# Patient Record
Sex: MALE | Race: BLACK OR AFRICAN AMERICAN | NOT HISPANIC OR LATINO | Employment: FULL TIME | URBAN - METROPOLITAN AREA
[De-identification: names, ages, dates, MRNs, and addresses within clinical notes are randomized per-mention and may not be internally consistent; named-entity substitution may affect disease eponyms.]

---

## 2024-07-09 ENCOUNTER — OFFICE VISIT (OUTPATIENT)
Dept: PRIMARY CARE | Facility: HOSPITAL | Age: 49
End: 2024-07-09
Payer: COMMERCIAL

## 2024-07-09 VITALS
WEIGHT: 240.9 LBS | OXYGEN SATURATION: 95 % | HEART RATE: 82 BPM | TEMPERATURE: 97.8 F | HEIGHT: 72 IN | BODY MASS INDEX: 32.63 KG/M2 | DIASTOLIC BLOOD PRESSURE: 80 MMHG | SYSTOLIC BLOOD PRESSURE: 130 MMHG

## 2024-07-09 DIAGNOSIS — Z00.00 ROUTINE HEALTH MAINTENANCE: Primary | ICD-10-CM

## 2024-07-09 DIAGNOSIS — K42.9 UMBILICAL HERNIA WITHOUT OBSTRUCTION AND WITHOUT GANGRENE: ICD-10-CM

## 2024-07-09 DIAGNOSIS — N40.1 BENIGN PROSTATIC HYPERPLASIA WITH URINARY FREQUENCY: ICD-10-CM

## 2024-07-09 DIAGNOSIS — E55.9 VITAMIN D DEFICIENCY: ICD-10-CM

## 2024-07-09 DIAGNOSIS — E88.819 INSULIN RESISTANCE: ICD-10-CM

## 2024-07-09 DIAGNOSIS — J35.1 ENLARGED TONSILS: ICD-10-CM

## 2024-07-09 DIAGNOSIS — R35.0 BENIGN PROSTATIC HYPERPLASIA WITH URINARY FREQUENCY: ICD-10-CM

## 2024-07-09 DIAGNOSIS — E78.5 HYPERLIPIDEMIA, UNSPECIFIED HYPERLIPIDEMIA TYPE: ICD-10-CM

## 2024-07-09 DIAGNOSIS — R19.4 CHANGE IN BOWEL HABITS: ICD-10-CM

## 2024-07-09 PROCEDURE — 99213 OFFICE O/P EST LOW 20 MIN: CPT | Performed by: INTERNAL MEDICINE

## 2024-07-09 PROCEDURE — 99396 PREV VISIT EST AGE 40-64: CPT | Performed by: INTERNAL MEDICINE

## 2024-07-09 RX ORDER — BISMUTH SUBSALICYLATE 262 MG
1 TABLET,CHEWABLE ORAL DAILY
COMMUNITY

## 2024-07-09 RX ORDER — ERGOCALCIFEROL 1.25 MG/1
50000 CAPSULE ORAL
Qty: 12 CAPSULE | Refills: 0 | Status: SHIPPED | OUTPATIENT
Start: 2024-07-14 | End: 2024-10-06

## 2024-07-09 ASSESSMENT — ENCOUNTER SYMPTOMS
OCCASIONAL FEELINGS OF UNSTEADINESS: 0
LOSS OF SENSATION IN FEET: 0
DEPRESSION: 0

## 2024-07-09 NOTE — PATIENT INSTRUCTIONS
Fasting blood work and urinalysis    Schedule colonoscopy    Cut out dairy products from your diet     Limit alcohol to weekends only - 2-3 per night

## 2024-07-09 NOTE — PROGRESS NOTES
Chief Complaint   Patient presents with    [facility]     NPV stomach issues         History Of Present Illness:    Abhijit Brooks is a 48 y.o. male presenting to establish care, update health maintenance, and address concerns about change in bowel habits.  Abhijit has a history of insulin resistance and dyslipidemia.  He is due for fasting blood work.  He has been drinking more alcohol recently, he is concerned that his blood sugar may be increasing.  He is a former smoker and quit 3 years ago.  He is currently drinking about half pint of alcohol per day over the past 6 months.  He admits that he drinks in response to stress.  Over the past 8 months he has been experiencing change in bowel habits.  He will wake up early in the morning to have an urgent bowel movement.  His stools are loose but there is no blood.  His symptoms primarily happen in the morning.  He does not have urgent bowel movements during the day at work.  He was recently on vacation and experienced the same symptoms.  He denies cherie abdominal pain, but he describes cramping.  He denies nausea or vomiting.  He denies fevers chills or sweats at night.  He has also been experiencing urgent urination.  Urine stream has reduced over time but is still relatively normal.  He denies pain or burning with urination.  He has observed a small bulge on his bellybutton and is concerned about hernia.      Active Medical Problems:  Patient Active Problem List    Diagnosis Date Noted    Routine health maintenance 07/12/2024    Vitamin D deficiency 07/11/2024    Benign prostatic hyperplasia with urinary frequency 07/11/2024    Change in bowel habits 07/11/2024    Umbilical hernia without obstruction and without gangrene 07/11/2024    Insulin resistance 07/09/2024    Hyperlipidemia 07/09/2024    Enlarged tonsils 07/09/2024       Past Medical History:  Past Medical History:   Diagnosis Date    Enlarged tonsils     Hyperlipidemia     Insulin resistance     Kidney  "stone 2017    passed spontaneously       Past Surgical History:  Past Surgical History:   Procedure Laterality Date    HERNIA REPAIR Bilateral          Social History:  Social History     Tobacco Use    Smoking status: Never    Smokeless tobacco: Never   Vaping Use    Vaping status: Never Used   Substance Use Topics    Alcohol use: Yes     Alcohol/week: 10.0 standard drinks of alcohol     Types: 10 Standard drinks or equivalent per week    Drug use: Never         Family History:  Family History   Problem Relation Name Age of Onset    Stroke Mother      Coronary artery disease Mother      Hypertension Mother      Hyperlipidemia Mother      Scoliosis Sister      No Known Problems Brother      No Known Problems Son          Allergies:  Patient has no known allergies.    Outpatient Medications:    Current Outpatient Medications:     [START ON 7/14/2024] ergocalciferol (Vitamin D-2) 1.25 MG (91001 UT) capsule, Take 1 capsule (50,000 Units) by mouth 1 (one) time per week., Disp: 12 capsule, Rfl: 0    multivitamin tablet, Take 1 tablet by mouth once daily., Disp: , Rfl:       Review of Systems:  Pertinent positives in review of systems outlined above.  Complete ROS otherwise negative.        Last Recorded Vitals:  Vitals:    07/09/24 1107 07/09/24 1208   BP: (!) 151/105 130/80   BP Location: Left arm    Patient Position: Sitting    BP Cuff Size: Large adult    Pulse: 82    Temp: 36.6 °C (97.8 °F)    SpO2: 95%    Weight: 109 kg (240 lb 14.4 oz)    Height: 1.816 m (5' 11.5\")    Body mass index is 33.13 kg/m².        Physical Exam  HENT:      Mouth/Throat:      Pharynx: Oropharynx is clear.   Eyes:      Extraocular Movements: Extraocular movements intact.      Conjunctiva/sclera: Conjunctivae normal.      Pupils: Pupils are equal, round, and reactive to light.   Cardiovascular:      Rate and Rhythm: Normal rate and regular rhythm.      Pulses: Normal pulses.      Heart sounds: Normal heart sounds.   Pulmonary:      Effort: " Pulmonary effort is normal.      Breath sounds: Normal breath sounds.   Abdominal:      General: Abdomen is flat. Bowel sounds are normal. There is no distension.      Palpations: Abdomen is soft. There is no mass.      Tenderness: There is no right CVA tenderness or left CVA tenderness.   Musculoskeletal:      Right lower leg: No edema.      Left lower leg: No edema.   Neurological:      General: No focal deficit present.          The 10-year ASCVD risk score (Fanny AGUILAR, et al., 2019) is: 5.2%    Values used to calculate the score:      Age: 48 years      Sex: Male      Is Non- : Yes      Diabetic: No      Tobacco smoker: No      Systolic Blood Pressure: 130 mmHg      Is BP treated: No      HDL Cholesterol: 49.8 mg/dL      Total Cholesterol: 229 mg/dL      Assessment/Plan   Problem List Items Addressed This Visit       Insulin resistance     Fasting blood sugar and hemoglobin A1c are due now.         Relevant Orders    Comprehensive Metabolic Panel (Completed)    Hemoglobin A1C (Completed)    Hyperlipidemia     Fasting lipids due now.         Relevant Orders    Lipid Panel (Completed)    Enlarged tonsils     Abhijit is currently asymptomatic.  His tonsils are enlarged, and he describes snoring at night   We discussed moving forward with a home apnea test if snoring persists or worsens.         Relevant Orders    Comprehensive Metabolic Panel (Completed)    Vitamin D deficiency     Abhijit has a history of significant vitamin D deficiency, and a vitamin D level will be included with his blood work.         Relevant Medications    ergocalciferol (Vitamin D-2) 1.25 MG (15665 UT) capsule (Start on 7/14/2024)    Other Relevant Orders    Comprehensive Metabolic Panel (Completed)    Vitamin D 25-Hydroxy,Total (for eval of Vitamin D levels) (Completed)    Benign prostatic hyperplasia with urinary frequency     PSA and urinalysis due now.  He will move forward with a trial of daily tadalafil for  symptoms persist or worsen.         Relevant Orders    Comprehensive Metabolic Panel (Completed)    Prostate Specific Antigen (Completed)    Urinalysis with Reflex Microscopic (Completed)    Change in bowel habits     Abhijit describes a subacute change in bowel habits over the past 8 months.  He is not having fevers or chills, losing weight or having blood in his stool.  That is reassuring.  His symptoms do correlate with increased alcohol intake, he is going to begin to curb his drinking.  He is going to limit drinking to 1 to 2 days/week and only 1-2 drinks per day.  Comprehensive metabolic panel, CBC, tissue transglutaminase antibodies will be included with his blood work.  He is going to observe a strict lactose-free diet.  Colon cancer screening is due now, and a colonoscopy was ordered.  He will call if his symptoms do not respond to diet changes.         Relevant Orders    Comprehensive Metabolic Panel (Completed)    Tissue Transglutaminase IgA (Completed)    Colonoscopy Screening; Average Risk Patient    Umbilical hernia without obstruction and without gangrene     Abhijit has a small reducible umbilical hernia.  We discussed that the umbilical hernia is not related to his other digestive symptoms.  He is interested in treatment and was referred to general surgery.         Relevant Orders    Referral to General Surgery    Routine health maintenance - Primary     Blood pressure top normal, addressed separately.  Fasting blood sugar and fasting lipid profile due now.  PSA screen will be updated.  Colonoscopy ordered.  Eye exam encouraged.  Tdap vaccine up-to-date.  No concerns about STIs.              Jasper Mendoza MD

## 2024-07-10 ENCOUNTER — LAB (OUTPATIENT)
Dept: LAB | Facility: LAB | Age: 49
End: 2024-07-10
Payer: COMMERCIAL

## 2024-07-10 DIAGNOSIS — N40.1 BENIGN PROSTATIC HYPERPLASIA WITH URINARY FREQUENCY: ICD-10-CM

## 2024-07-10 DIAGNOSIS — E88.819 INSULIN RESISTANCE: ICD-10-CM

## 2024-07-10 DIAGNOSIS — E55.9 VITAMIN D DEFICIENCY: ICD-10-CM

## 2024-07-10 DIAGNOSIS — R35.0 BENIGN PROSTATIC HYPERPLASIA WITH URINARY FREQUENCY: ICD-10-CM

## 2024-07-10 DIAGNOSIS — E78.5 HYPERLIPIDEMIA, UNSPECIFIED HYPERLIPIDEMIA TYPE: ICD-10-CM

## 2024-07-10 DIAGNOSIS — J35.1 ENLARGED TONSILS: ICD-10-CM

## 2024-07-10 DIAGNOSIS — R19.4 CHANGE IN BOWEL HABITS: ICD-10-CM

## 2024-07-10 LAB
25(OH)D3 SERPL-MCNC: 20 NG/ML (ref 30–100)
ALBUMIN SERPL BCP-MCNC: 4.1 G/DL (ref 3.4–5)
ALP SERPL-CCNC: 64 U/L (ref 33–120)
ALT SERPL W P-5'-P-CCNC: 39 U/L (ref 10–52)
ANION GAP SERPL CALC-SCNC: 10 MMOL/L (ref 10–20)
APPEARANCE UR: CLEAR
AST SERPL W P-5'-P-CCNC: 27 U/L (ref 9–39)
BILIRUB SERPL-MCNC: 1 MG/DL (ref 0–1.2)
BILIRUB UR STRIP.AUTO-MCNC: NEGATIVE MG/DL
BUN SERPL-MCNC: 15 MG/DL (ref 6–23)
CALCIUM SERPL-MCNC: 8.5 MG/DL (ref 8.6–10.3)
CHLORIDE SERPL-SCNC: 104 MMOL/L (ref 98–107)
CHOLEST SERPL-MCNC: 229 MG/DL (ref 0–199)
CHOLESTEROL/HDL RATIO: 4.6
CO2 SERPL-SCNC: 26 MMOL/L (ref 21–32)
COLOR UR: NORMAL
CREAT SERPL-MCNC: 0.96 MG/DL (ref 0.5–1.3)
EGFRCR SERPLBLD CKD-EPI 2021: >90 ML/MIN/1.73M*2
EST. AVERAGE GLUCOSE BLD GHB EST-MCNC: 114 MG/DL
GLUCOSE SERPL-MCNC: 94 MG/DL (ref 74–99)
GLUCOSE UR STRIP.AUTO-MCNC: NORMAL MG/DL
HBA1C MFR BLD: 5.6 %
HDLC SERPL-MCNC: 49.8 MG/DL
KETONES UR STRIP.AUTO-MCNC: NEGATIVE MG/DL
LDLC SERPL CALC-MCNC: 161 MG/DL
LEUKOCYTE ESTERASE UR QL STRIP.AUTO: NEGATIVE
NITRITE UR QL STRIP.AUTO: NEGATIVE
NON HDL CHOLESTEROL: 179 MG/DL (ref 0–149)
PH UR STRIP.AUTO: 6.5 [PH]
POTASSIUM SERPL-SCNC: 4.4 MMOL/L (ref 3.5–5.3)
PROT SERPL-MCNC: 6.7 G/DL (ref 6.4–8.2)
PROT UR STRIP.AUTO-MCNC: NEGATIVE MG/DL
PSA SERPL-MCNC: 0.28 NG/ML
RBC # UR STRIP.AUTO: NEGATIVE /UL
SODIUM SERPL-SCNC: 136 MMOL/L (ref 136–145)
SP GR UR STRIP.AUTO: 1.02
TRIGL SERPL-MCNC: 91 MG/DL (ref 0–149)
TTG IGA SER IA-ACNC: <1 U/ML
UROBILINOGEN UR STRIP.AUTO-MCNC: NORMAL MG/DL
VLDL: 18 MG/DL (ref 0–40)

## 2024-07-10 PROCEDURE — 84153 ASSAY OF PSA TOTAL: CPT

## 2024-07-10 PROCEDURE — 83516 IMMUNOASSAY NONANTIBODY: CPT

## 2024-07-10 PROCEDURE — 81003 URINALYSIS AUTO W/O SCOPE: CPT

## 2024-07-10 PROCEDURE — 80061 LIPID PANEL: CPT

## 2024-07-10 PROCEDURE — 82306 VITAMIN D 25 HYDROXY: CPT

## 2024-07-10 PROCEDURE — 83036 HEMOGLOBIN GLYCOSYLATED A1C: CPT

## 2024-07-10 PROCEDURE — 36415 COLL VENOUS BLD VENIPUNCTURE: CPT

## 2024-07-10 PROCEDURE — 80053 COMPREHEN METABOLIC PANEL: CPT

## 2024-07-11 PROBLEM — E55.9 VITAMIN D DEFICIENCY: Status: ACTIVE | Noted: 2024-07-11

## 2024-07-11 PROBLEM — K42.9 UMBILICAL HERNIA WITHOUT OBSTRUCTION AND WITHOUT GANGRENE: Status: ACTIVE | Noted: 2024-07-11

## 2024-07-11 PROBLEM — N40.1 BENIGN PROSTATIC HYPERPLASIA WITH URINARY FREQUENCY: Status: ACTIVE | Noted: 2024-07-11

## 2024-07-11 PROBLEM — R35.0 BENIGN PROSTATIC HYPERPLASIA WITH URINARY FREQUENCY: Status: ACTIVE | Noted: 2024-07-11

## 2024-07-11 PROBLEM — R19.4 CHANGE IN BOWEL HABITS: Status: ACTIVE | Noted: 2024-07-11

## 2024-07-11 NOTE — ASSESSMENT & PLAN NOTE
PSA and urinalysis due now.  He will move forward with a trial of daily tadalafil for symptoms persist or worsen.

## 2024-07-11 NOTE — ASSESSMENT & PLAN NOTE
Abhijit has a history of significant vitamin D deficiency, and a vitamin D level will be included with his blood work.

## 2024-07-11 NOTE — ASSESSMENT & PLAN NOTE
Abhijit is currently asymptomatic.  His tonsils are enlarged, and he describes snoring at night   We discussed moving forward with a home apnea test if snoring persists or worsens.

## 2024-07-11 NOTE — ASSESSMENT & PLAN NOTE
Abhijit has a small reducible umbilical hernia.  We discussed that the umbilical hernia is not related to his other digestive symptoms.  He is interested in treatment and was referred to general surgery.

## 2024-07-12 PROBLEM — Z00.00 ROUTINE HEALTH MAINTENANCE: Status: ACTIVE | Noted: 2024-07-12

## 2024-07-12 NOTE — ASSESSMENT & PLAN NOTE
Blood pressure top normal, addressed separately.  Fasting blood sugar and fasting lipid profile due now.  PSA screen will be updated.  Colonoscopy ordered.  Eye exam encouraged.  Tdap vaccine up-to-date.  No concerns about STIs.

## 2024-07-16 ENCOUNTER — OFFICE VISIT (OUTPATIENT)
Dept: SURGERY | Facility: CLINIC | Age: 49
End: 2024-07-16
Payer: COMMERCIAL

## 2024-07-16 VITALS
SYSTOLIC BLOOD PRESSURE: 127 MMHG | TEMPERATURE: 97.5 F | HEART RATE: 81 BPM | DIASTOLIC BLOOD PRESSURE: 79 MMHG | WEIGHT: 243 LBS | BODY MASS INDEX: 34.02 KG/M2 | HEIGHT: 71 IN | OXYGEN SATURATION: 96 %

## 2024-07-16 DIAGNOSIS — K42.9 UMBILICAL HERNIA WITHOUT OBSTRUCTION AND WITHOUT GANGRENE: ICD-10-CM

## 2024-07-16 PROCEDURE — 1036F TOBACCO NON-USER: CPT | Performed by: STUDENT IN AN ORGANIZED HEALTH CARE EDUCATION/TRAINING PROGRAM

## 2024-07-16 PROCEDURE — 99205 OFFICE O/P NEW HI 60 MIN: CPT | Performed by: STUDENT IN AN ORGANIZED HEALTH CARE EDUCATION/TRAINING PROGRAM

## 2024-07-16 PROCEDURE — 99215 OFFICE O/P EST HI 40 MIN: CPT | Performed by: STUDENT IN AN ORGANIZED HEALTH CARE EDUCATION/TRAINING PROGRAM

## 2024-07-16 ASSESSMENT — PAIN SCALES - GENERAL: PAINLEVEL: 0-NO PAIN

## 2024-07-16 ASSESSMENT — ENCOUNTER SYMPTOMS: DEPRESSION: 0

## 2024-07-16 NOTE — PROGRESS NOTES
History Of Present Illness  Patient is a 48 y.o. male who presents to discuss workup and/or interventions for a umbilical hernia.  He first noticed hernia approximately 8 to 9 months ago that has been slowly increasing in size since then.  The hernia is minimally symptomatic with intermittent episodes of dull pain.  This however does not seem to bother him much.  He denies any episodes of incarceration or strangulation, denies any nausea or vomiting, denies any fevers or chills.  He is overall healthy.  He does have a history of bilateral open inguinal hernia repairs as a child.  No other past surgical history.     Past Medical History  Past Medical History:   Diagnosis Date    Enlarged tonsils     Hyperlipidemia     Insulin resistance     Kidney stone 2017    passed spontaneously       Surgical History  Past Surgical History:   Procedure Laterality Date    HERNIA REPAIR Bilateral         Social History  He reports that he quit smoking about 2 years ago. His smoking use included cigarettes. He has never used smokeless tobacco. He reports current alcohol use of about 10.0 standard drinks of alcohol per week. He reports that he does not use drugs.    Family History  Family History   Problem Relation Name Age of Onset    Stroke Mother      Coronary artery disease Mother      Hypertension Mother      Hyperlipidemia Mother      Scoliosis Sister      No Known Problems Brother      No Known Problems Son          Allergies  Patient has no known allergies.    Review of Systems  - CONSTITUTIONAL: Denies fever and chills.  - HEENT: Denies changes in vision and hearing.  - RESPIRATORY: Denies SOB and cough.  - CV: Denies palpitations and CP.  - GI: Denies abdominal pain, nausea, vomiting and diarrhea.  - : Denies dysuria and urinary frequency.  - MSK: Denies myalgia and joint pain.  - SKIN: Denies rash and pruritus.  - NEUROLOGICAL: Denies headache and syncope.  - PSYCHIATRIC: Denies recent changes in mood. Denies anxiety and  "depression.     Physical Exam  - GENERAL: Alert and oriented x 3. No acute distress. Well-nourished.  - EYES: EOMI. Anicteric.  - HENT: Moist mucous membranes. No scleral icterus. No cervical lymphadenopathy.  - LUNGS: Breathing comfortably on room air. No accessory muscle use, no distress.  - CARDIOVASCULAR: Regular rate and rhythm. No murmur. No JVD.  - ABDOMEN: Soft, non-tender and non-distended.  Umbilical hernia, not fully reducible given the small fascial defect which measures approximately 1 cm.  - EXTREMITIES: No edema. Non-tender.  - SKIN: No rashes or lesions. Warm.  - NEUROLOGIC: No focal neurological deficits. CN II-XII grossly intact, but not individually tested.  - PSYCHIATRIC: Cooperative. Appropriate mood and affect.    Last Recorded Vitals  Blood pressure 127/79, pulse 81, temperature 36.4 °C (97.5 °F), height 1.803 m (5' 11\"), weight 110 kg (243 lb), SpO2 96%.    Relevant Results  No results found.     Assessment and Plan  Patient is a 48 y.o. male who presents to discuss further workup and or management of a minimally symptomatic umbilical hernia.  The etiology, pathophysiology, natural course, various treatment options for umbilical hernias were explained the patient voiced understanding.  I did discuss both a watchful waiting and surgical interventions with the patient.  Given his young age, lack of any medical comorbidities and amenability of the hernia to a primary repair, after further discussion decision was made to proceed with surgery.  Therefore the risk and benefits of surgery were explained to the patient.  The risk of bleeding, infection, nonresolution of symptoms, hernia recurrence, anesthesia complications were all explained to the patient he voiced understanding.  Informed consent was signed in the clinic to be scheduled in the near future for the above procedure without the need for PAT.    Jose Antonio Mcbride MD   "

## 2024-08-06 ENCOUNTER — OFFICE VISIT (OUTPATIENT)
Dept: PRIMARY CARE | Facility: HOSPITAL | Age: 49
End: 2024-08-06
Payer: COMMERCIAL

## 2024-08-06 VITALS
HEART RATE: 81 BPM | SYSTOLIC BLOOD PRESSURE: 120 MMHG | BODY MASS INDEX: 33.24 KG/M2 | OXYGEN SATURATION: 95 % | DIASTOLIC BLOOD PRESSURE: 80 MMHG | TEMPERATURE: 98.3 F | WEIGHT: 238.3 LBS

## 2024-08-06 DIAGNOSIS — E88.819 INSULIN RESISTANCE: Primary | ICD-10-CM

## 2024-08-06 DIAGNOSIS — E78.5 HYPERLIPIDEMIA, UNSPECIFIED HYPERLIPIDEMIA TYPE: ICD-10-CM

## 2024-08-06 DIAGNOSIS — E55.9 VITAMIN D DEFICIENCY: ICD-10-CM

## 2024-08-06 PROCEDURE — 99213 OFFICE O/P EST LOW 20 MIN: CPT | Performed by: INTERNAL MEDICINE

## 2024-08-06 NOTE — PATIENT INSTRUCTIONS
Increase intake of cholesterol lowering foods - Portfolio Diet     Limit intake - red meat, processed meat, saturated fat     Limit sodium to 1500 mg per day    1gm fiber per 5g carb (Baljit)    Cholesterol level in 3 mo

## 2024-08-06 NOTE — PROGRESS NOTES
Chief Complaint:   Follow-up     History Of Present Illness:    Abhijit Brooks is a 48 y.o. male with active medical problems outlined below who presents for evaluation follow-up of stomach upset and to review labs.    Initial visit 7/9/2024  Abhijit has a history of insulin resistance and dyslipidemia.  He is due for fasting blood work.  He has been drinking more alcohol recently, he is concerned that his blood sugar may be increasing.  He is a former smoker and quit 3 years ago.  He is currently drinking about half pint of alcohol per day over the past 6 months.  He admits that he drinks in response to stress.  Over the past 8 months he has been experiencing change in bowel habits.  He will wake up early in the morning to have an urgent bowel movement.  His stools are loose but there is no blood.  His symptoms primarily happen in the morning.  He does not have urgent bowel movements during the day at work.  He was recently on vacation and experienced the same symptoms.  He denies cherie abdominal pain, but he describes cramping.  He denies nausea or vomiting.  He denies fevers chills or sweats at night.  He has also been experiencing urgent urination.  Urine stream has reduced over time but is still relatively normal.  He denies pain or burning with urination.  He has observed a small bulge on his bellybutton and is concerned about hernia.    INTERVAL HISTORY 8/6/24  Walking and biking for exercise and feeling well  Abhijit eliminated alcohol and cut back on dairy, and his digestive symptoms have improved significantly.   Planing for umbilical hernia repair - will schedule in next month   Colonoscopy scheduled for Dec.          Patient Active Problem List   Diagnosis    Insulin resistance    Hyperlipidemia    Enlarged tonsils    Vitamin D deficiency    Benign prostatic hyperplasia with urinary frequency    Change in bowel habits    Umbilical hernia without obstruction and without gangrene    Routine health  maintenance       Current Outpatient Medications:     ergocalciferol (Vitamin D-2) 1.25 MG (71188 UT) capsule, Take 1 capsule (50,000 Units) by mouth 1 (one) time per week., Disp: 12 capsule, Rfl: 0    multivitamin tablet, Take 1 tablet by mouth once daily., Disp: , Rfl:   Patient has no known allergies.  Social History     Tobacco Use    Smoking status: Former     Current packs/day: 0.00     Types: Cigarettes     Quit date:      Years since quittin.6    Smokeless tobacco: Never   Vaping Use    Vaping status: Never Used   Substance Use Topics    Alcohol use: Yes     Alcohol/week: 10.0 standard drinks of alcohol     Types: 10 Standard drinks or equivalent per week    Drug use: Never       All pertinent aspects of medical and surgical history were reviewed and updated in chart    Review of Systems   Pertinent positives in review of systems outlined above.  Complete ROS otherwise negative.        Last Recorded Vitals:  No data found.         Physical Exam  HENT:      Mouth/Throat:      Pharynx: Oropharynx is clear.   Eyes:      Extraocular Movements: Extraocular movements intact.      Conjunctiva/sclera: Conjunctivae normal.      Pupils: Pupils are equal, round, and reactive to light.   Cardiovascular:      Rate and Rhythm: Normal rate and regular rhythm.   Pulmonary:      Effort: Pulmonary effort is normal.      Breath sounds: Normal breath sounds.        The 10-year ASCVD risk score (Fanny AGUILAR, et al., 2019) is: 4.5%    Values used to calculate the score:      Age: 48 years      Sex: Male      Is Non- : Yes      Diabetic: No      Tobacco smoker: No      Systolic Blood Pressure: 120 mmHg      Is BP treated: No      HDL Cholesterol: 49.8 mg/dL      Total Cholesterol: 229 mg/dL      Assessment/Plan   Problem List Items Addressed This Visit       Insulin resistance - Primary     Abhijit is going to focus on exercising more and improving his diet.  We discussed the elements of healthy  plant-based whole food diet and data showing plant-based diet as an intervention to prevent and reverse diabetes.  He is going to eliminate alcohol completely.  Fasting blood sugar and A1c will be repeated prior to his next visit.         Relevant Orders    Comprehensive Metabolic Panel    Hyperlipidemia     We discussed remedies for lowering cholesterol including increasing intake of cholesterol lowering foods.  I referred him to the portfolio diet website for complete list of those foods.  He will work on limiting intake of red meat, processed meat and saturated fat.  Fasting lipids will be repeated prior to his next visit.         Relevant Orders    Lipid Panel    Comprehensive Metabolic Panel    Vitamin D deficiency     He will start a vitamin D supplement, 2000 units daily.  Vitamin D will be reassessed in 3 months.         Relevant Orders    Vitamin D 25-Hydroxy,Total (for eval of Vitamin D levels)       A total of 20 minutes was spent reviewing the chart and recent testing and discussing plan of care.         Jasper Mendoza MD

## 2024-08-09 NOTE — ASSESSMENT & PLAN NOTE
Abhijit is going to focus on exercising more and improving his diet.  We discussed the elements of healthy plant-based whole food diet and data showing plant-based diet as an intervention to prevent and reverse diabetes.  He is going to eliminate alcohol completely.  Fasting blood sugar and A1c will be repeated prior to his next visit.

## 2024-08-09 NOTE — ASSESSMENT & PLAN NOTE
We discussed remedies for lowering cholesterol including increasing intake of cholesterol lowering foods.  I referred him to the portfolio diet website for complete list of those foods.  He will work on limiting intake of red meat, processed meat and saturated fat.  Fasting lipids will be repeated prior to his next visit.

## 2024-08-09 NOTE — ASSESSMENT & PLAN NOTE
He will start a vitamin D supplement, 2000 units daily.  Vitamin D will be reassessed in 3 months.

## 2024-11-06 ENCOUNTER — APPOINTMENT (OUTPATIENT)
Dept: PRIMARY CARE | Facility: CLINIC | Age: 49
End: 2024-11-06
Payer: COMMERCIAL

## 2024-11-23 ENCOUNTER — OFFICE VISIT (OUTPATIENT)
Dept: URGENT CARE | Age: 49
End: 2024-11-23
Payer: COMMERCIAL

## 2024-11-23 VITALS
BODY MASS INDEX: 34.17 KG/M2 | OXYGEN SATURATION: 96 % | HEART RATE: 89 BPM | WEIGHT: 245 LBS | TEMPERATURE: 97.5 F | DIASTOLIC BLOOD PRESSURE: 99 MMHG | SYSTOLIC BLOOD PRESSURE: 142 MMHG

## 2024-11-23 DIAGNOSIS — T22.212A PARTIAL THICKNESS BURN OF LEFT FOREARM, INITIAL ENCOUNTER: ICD-10-CM

## 2024-11-23 DIAGNOSIS — T30.0 BURN: Primary | ICD-10-CM

## 2024-11-23 DIAGNOSIS — T22.211A PARTIAL THICKNESS BURN OF RIGHT FOREARM, INITIAL ENCOUNTER: ICD-10-CM
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
## 2025-01-23 ENCOUNTER — HOSPITAL ENCOUNTER (OUTPATIENT)
Dept: GASTROENTEROLOGY | Facility: HOSPITAL | Age: 50
Discharge: HOME | End: 2025-01-23
Payer: COMMERCIAL

## 2025-01-23 VITALS
HEIGHT: 71 IN | WEIGHT: 245.81 LBS | OXYGEN SATURATION: 96 % | SYSTOLIC BLOOD PRESSURE: 109 MMHG | DIASTOLIC BLOOD PRESSURE: 66 MMHG | HEART RATE: 85 BPM | RESPIRATION RATE: 17 BRPM | TEMPERATURE: 96.8 F | BODY MASS INDEX: 34.41 KG/M2

## 2025-01-23 DIAGNOSIS — R19.4 CHANGE IN BOWEL HABITS: Primary | ICD-10-CM

## 2025-01-23 PROCEDURE — 7100000009 HC PHASE TWO TIME - INITIAL BASE CHARGE

## 2025-01-23 PROCEDURE — 2500000004 HC RX 250 GENERAL PHARMACY W/ HCPCS (ALT 636 FOR OP/ED): Performed by: INTERNAL MEDICINE

## 2025-01-23 PROCEDURE — 45385 COLONOSCOPY W/LESION REMOVAL: CPT | Performed by: INTERNAL MEDICINE

## 2025-01-23 PROCEDURE — 3700000013 HC SEDATION LEVEL 5+ TIME - EACH ADDITIONAL 15 MINUTES

## 2025-01-23 PROCEDURE — 7100000010 HC PHASE TWO TIME - EACH INCREMENTAL 1 MINUTE

## 2025-01-23 PROCEDURE — 3700000012 HC SEDATION LEVEL 5+ TIME - INITIAL 15 MINUTES 5/> YEARS

## 2025-01-23 RX ORDER — MIDAZOLAM HYDROCHLORIDE 1 MG/ML
INJECTION INTRAMUSCULAR; INTRAVENOUS AS NEEDED
Status: COMPLETED | OUTPATIENT
Start: 2025-01-23 | End: 2025-01-23

## 2025-01-23 RX ADMIN — MEPERIDINE HYDROCHLORIDE 50 MG: 25 INJECTION, SOLUTION INTRAMUSCULAR; INTRAVENOUS; SUBCUTANEOUS at 11:05

## 2025-01-23 RX ADMIN — MIDAZOLAM HYDROCHLORIDE 1 MG: 1 INJECTION, SOLUTION INTRAMUSCULAR; INTRAVENOUS at 11:08

## 2025-01-23 RX ADMIN — MEPERIDINE HYDROCHLORIDE 25 MG: 25 INJECTION, SOLUTION INTRAMUSCULAR; INTRAVENOUS; SUBCUTANEOUS at 11:08

## 2025-01-23 RX ADMIN — MIDAZOLAM HYDROCHLORIDE 2 MG: 1 INJECTION, SOLUTION INTRAMUSCULAR; INTRAVENOUS at 11:05

## 2025-01-23 ASSESSMENT — PAIN SCALES - GENERAL
PAINLEVEL_OUTOF10: 0 - NO PAIN

## 2025-01-23 ASSESSMENT — COLUMBIA-SUICIDE SEVERITY RATING SCALE - C-SSRS
1. IN THE PAST MONTH, HAVE YOU WISHED YOU WERE DEAD OR WISHED YOU COULD GO TO SLEEP AND NOT WAKE UP?: NO
6. HAVE YOU EVER DONE ANYTHING, STARTED TO DO ANYTHING, OR PREPARED TO DO ANYTHING TO END YOUR LIFE?: NO
2. HAVE YOU ACTUALLY HAD ANY THOUGHTS OF KILLING YOURSELF?: NO

## 2025-01-23 ASSESSMENT — PAIN - FUNCTIONAL ASSESSMENT
PAIN_FUNCTIONAL_ASSESSMENT: 0-10

## 2025-01-23 NOTE — H&P
"History Of Present Illness  Abhijit Brooks is a 49 y.o. male presenting with screening.     Past Medical History  Past Medical History:   Diagnosis Date    Enlarged tonsils     Hyperlipidemia     Insulin resistance     Kidney stone 2017    passed spontaneously     Surgical History  Past Surgical History:   Procedure Laterality Date    HERNIA REPAIR Bilateral      Social History  He reports that he quit smoking about 3 years ago. His smoking use included cigarettes. He has never used smokeless tobacco. He reports current alcohol use of about 10.0 standard drinks of alcohol per week. He reports that he does not use drugs.    Family History  Family History   Problem Relation Name Age of Onset    Stroke Mother      Coronary artery disease Mother      Hypertension Mother      Hyperlipidemia Mother      Scoliosis Sister      No Known Problems Brother      No Known Problems Son          Allergies  Allergies   Allergen Reactions    Cat's Claw (Uncaria Tomentosa) Other    Shellfish Containing Products Other     Review of Systems  Pre-sedation Evaluation:  ASA Classification - ASA 1 - Normal health patient  Mallampati Score - II (hard and soft palate, upper portion of tonsils and uvula visible)    Physical Exam  Vitals reviewed.   Constitutional:       Appearance: Normal appearance.   Cardiovascular:      Rate and Rhythm: Normal rate and regular rhythm.   Pulmonary:      Effort: Pulmonary effort is normal.      Breath sounds: Normal breath sounds.   Neurological:      Mental Status: He is alert.          Last Recorded Vitals  Blood pressure 140/88, pulse 67, temperature 36.1 °C (97 °F), temperature source Temporal, resp. rate 16, height 1.803 m (5' 10.98\"), weight 112 kg (245 lb 13 oz), SpO2 96%.     Assessment/Plan   R/O neoplasm for colonoscopy     PTA/Current Medications:  (Not in a hospital admission)    Current Outpatient Medications   Medication Sig Dispense Refill    multivitamin tablet Take 1 tablet by mouth once " daily.       No current facility-administered medications for this encounter.     Dion Ann MD

## 2025-01-23 NOTE — DISCHARGE INSTRUCTIONS
Patient Instructions after a Colonoscopy      The anesthetics, sedatives or narcotics which were given to you today will be acting in your body for the next 24 hours, so you might feel a little sleepy or groggy.  This feeling should slowly wear off. Carefully read and follow the instructions.     You received sedation today:  - Do not drive or operate any machinery or power tools of any kind.   - No alcoholic beverages today, not even beer or wine.  - Do not make any important decisions or sign any legal documents.  - No over the counter medications that contain alcohol or that may cause drowsiness.  - Do not make any important decisions or sign any legal documents.  - Make sure you have someone with you for first 24 hours.    While it is common to experience mild to moderate abdominal distention, gas, or belching after your procedure, if any of these symptoms occur following discharge from the GI Lab or within one week of having your procedure, call the [facility] to be advised whether a visit to your nearest Urgent Care or Emergency Department is indicated.  Take this paper with you if you go.     - If you develop an allergic reaction to the medications that were given during your procedure such as difficulty breathing, rash, hives, severe nausea, vomiting or lightheadedness.  - If you experience chest pain, shortness of breath, severe abdominal pain, fevers and chills.  -If you develop signs and symptoms of bleeding such as blood in your spit, if your stools turn black, tarry, or bloody  - If you have not urinated within 8 hours following your procedure.  - If your IV site becomes painful, red, inflamed, or looks infected.    If you received a biopsy/polypectomy/sphincterotomy the following instructions apply below:    __ Do not use Aspirin containing products, non-steroidal medications or anti-coagulants for one week following your procedure. (Examples of these types of medications are: Advil,  Arthrotec, Aleve, Coumadin, Ecotrin, Heparin, Ibuprofen, Indocin, Motrin, Naprosyn, Nuprin, Plavix, Vioxx, and Voltarin, or their generic forms.  This list is not all-inclusive.  Check with your physician or pharmacist before resuming medications.)   __ Eat a soft diet today.  Avoid foods that are poorly digested for the next 24 hours.  These foods would include: nuts, beans, lettuce, red meats, and fried foods. Start with liquids and advance your diet as tolerated, gradually work up to eating solids.   __ Do not have a Barium Study or Enema for one week.    Your physician recommends the additional following instructions:    -You have a contact number available for emergencies. The signs and symptoms of potential delayed complications were discussed with you. You may return to normal activities tomorrow.  -Resume your previous diet.  -Continue your present medications.   -We are waiting for your pathology results.  -Your physician has recommended a repeat colonoscopy (date to be determined after pending pathology results are reviewed) for surveillance based on pathology results.  -The findings and recommendations have been discussed with you.  -The findings and recommendations were discussed with your family.  - Please see Medication Reconciliation Form for new medication/medications prescribed.       If you experience any problems or have any questions following discharge from the GI Lab, please call:    Dion Ann MD  [phone]    To reach your physician after hours call [phone] and ask for the GI Fellow on call      Nurse Signature                                                                        Date___________________                                                                            Patient/Responsible Party Signature                                        Date___________________

## 2025-01-24 NOTE — ADDENDUM NOTE
Encounter addended by: Priscila Bach RN on: 1/24/2025 3:48 PM   Actions taken: Contacts section saved, Flowsheet accepted, Flowsheet macro applied

## 2025-01-29 LAB
LABORATORY COMMENT REPORT: NORMAL
PATH REPORT.FINAL DX SPEC: NORMAL
PATH REPORT.GROSS SPEC: NORMAL
PATH REPORT.RELEVANT HX SPEC: NORMAL
PATH REPORT.TOTAL CANCER: NORMAL